# Patient Record
Sex: MALE | Race: BLACK OR AFRICAN AMERICAN | Employment: UNEMPLOYED | URBAN - METROPOLITAN AREA
[De-identification: names, ages, dates, MRNs, and addresses within clinical notes are randomized per-mention and may not be internally consistent; named-entity substitution may affect disease eponyms.]

---

## 2017-08-08 ENCOUNTER — HOSPITAL ENCOUNTER (OUTPATIENT)
Dept: GENERAL RADIOLOGY | Age: 32
Discharge: HOME | End: 2017-08-08
Attending: INTERNAL MEDICINE

## 2017-08-08 DIAGNOSIS — M25.50 JOINT PAIN: ICD-10-CM

## 2017-08-08 DIAGNOSIS — K63.3 ULCER OF CECUM: ICD-10-CM

## 2018-07-02 ENCOUNTER — HOSPITAL ENCOUNTER (OUTPATIENT)
Dept: GENERAL RADIOLOGY | Age: 33
Discharge: HOME | End: 2018-07-02
Attending: INTERNAL MEDICINE
Payer: COMMERCIAL

## 2018-07-02 DIAGNOSIS — K50.00 CROHN'S DISEASE OF SMALL INTESTINE (HCC): ICD-10-CM

## 2018-07-02 PROCEDURE — 74011000255 HC RX REV CODE- 255: Performed by: INTERNAL MEDICINE

## 2018-07-02 PROCEDURE — 74250 X-RAY XM SM INT 1CNTRST STD: CPT

## 2018-07-02 RX ADMIN — BARIUM SULFATE 352 G: 960 POWDER, FOR SUSPENSION ORAL at 09:00

## 2018-12-18 ENCOUNTER — HOSPITAL ENCOUNTER (EMERGENCY)
Age: 33
Discharge: HOME | End: 2018-12-18
Attending: EMERGENCY MEDICINE
Payer: COMMERCIAL

## 2018-12-18 ENCOUNTER — APPOINTMENT (OUTPATIENT)
Dept: GENERAL RADIOLOGY | Age: 33
End: 2018-12-18
Attending: EMERGENCY MEDICINE
Payer: COMMERCIAL

## 2018-12-18 ENCOUNTER — APPOINTMENT (OUTPATIENT)
Dept: CT IMAGING | Age: 33
End: 2018-12-18
Attending: EMERGENCY MEDICINE
Payer: COMMERCIAL

## 2018-12-18 VITALS
OXYGEN SATURATION: 99 % | WEIGHT: 165 LBS | SYSTOLIC BLOOD PRESSURE: 112 MMHG | DIASTOLIC BLOOD PRESSURE: 75 MMHG | HEIGHT: 70 IN | RESPIRATION RATE: 14 BRPM | BODY MASS INDEX: 23.62 KG/M2 | HEART RATE: 64 BPM | TEMPERATURE: 97.6 F

## 2018-12-18 DIAGNOSIS — K50.119 CROHN'S DISEASE OF LARGE INTESTINE WITH COMPLICATION (HCC): Primary | ICD-10-CM

## 2018-12-18 DIAGNOSIS — R11.2 NAUSEA AND VOMITING, INTRACTABILITY OF VOMITING NOT SPECIFIED, UNSPECIFIED VOMITING TYPE: ICD-10-CM

## 2018-12-18 DIAGNOSIS — K56.699 STRICTURE OF BOWEL (HCC): ICD-10-CM

## 2018-12-18 DIAGNOSIS — E87.6 HYPOKALEMIA: ICD-10-CM

## 2018-12-18 DIAGNOSIS — E83.42 HYPOMAGNESEMIA: ICD-10-CM

## 2018-12-18 LAB
ALBUMIN SERPL-MCNC: 2.9 G/DL (ref 3.4–5)
ALBUMIN/GLOB SERPL: 0.7 {RATIO} (ref 0.8–1.7)
ALP SERPL-CCNC: 115 U/L (ref 45–117)
ALT SERPL-CCNC: 28 U/L (ref 16–61)
ANION GAP SERPL CALC-SCNC: 8 MMOL/L (ref 3–18)
AST SERPL-CCNC: 17 U/L (ref 15–37)
BASOPHILS # BLD: 0 K/UL (ref 0–0.1)
BASOPHILS NFR BLD: 0 % (ref 0–2)
BILIRUB SERPL-MCNC: 0.2 MG/DL (ref 0.2–1)
BUN SERPL-MCNC: 10 MG/DL (ref 7–18)
BUN/CREAT SERPL: 13 (ref 12–20)
CALCIUM SERPL-MCNC: 8.1 MG/DL (ref 8.5–10.1)
CHLORIDE SERPL-SCNC: 108 MMOL/L (ref 100–108)
CO2 SERPL-SCNC: 28 MMOL/L (ref 21–32)
CREAT SERPL-MCNC: 0.76 MG/DL (ref 0.6–1.3)
DIFFERENTIAL METHOD BLD: ABNORMAL
EOSINOPHIL # BLD: 0 K/UL (ref 0–0.4)
EOSINOPHIL NFR BLD: 0 % (ref 0–5)
ERYTHROCYTE [DISTWIDTH] IN BLOOD BY AUTOMATED COUNT: 15.7 % (ref 11.6–14.5)
GLOBULIN SER CALC-MCNC: 4.1 G/DL (ref 2–4)
GLUCOSE SERPL-MCNC: 128 MG/DL (ref 74–99)
HCT VFR BLD AUTO: 39 % (ref 36–48)
HGB BLD-MCNC: 11.9 G/DL (ref 13–16)
LIPASE SERPL-CCNC: 89 U/L (ref 73–393)
LYMPHOCYTES # BLD: 0.5 K/UL (ref 0.9–3.6)
LYMPHOCYTES NFR BLD: 7 % (ref 21–52)
MAGNESIUM SERPL-MCNC: 1.3 MG/DL (ref 1.6–2.6)
MCH RBC QN AUTO: 24.7 PG (ref 24–34)
MCHC RBC AUTO-ENTMCNC: 30.5 G/DL (ref 31–37)
MCV RBC AUTO: 80.9 FL (ref 74–97)
MONOCYTES # BLD: 0.2 K/UL (ref 0.05–1.2)
MONOCYTES NFR BLD: 3 % (ref 3–10)
NEUTS SEG # BLD: 6.3 K/UL (ref 1.8–8)
NEUTS SEG NFR BLD: 90 % (ref 40–73)
PLATELET # BLD AUTO: 379 K/UL (ref 135–420)
PMV BLD AUTO: 9.2 FL (ref 9.2–11.8)
POTASSIUM SERPL-SCNC: 2.8 MMOL/L (ref 3.5–5.5)
PROT SERPL-MCNC: 7 G/DL (ref 6.4–8.2)
RBC # BLD AUTO: 4.82 M/UL (ref 4.7–5.5)
SODIUM SERPL-SCNC: 144 MMOL/L (ref 136–145)
WBC # BLD AUTO: 7 K/UL (ref 4.6–13.2)

## 2018-12-18 PROCEDURE — 83735 ASSAY OF MAGNESIUM: CPT

## 2018-12-18 PROCEDURE — 99285 EMERGENCY DEPT VISIT HI MDM: CPT

## 2018-12-18 PROCEDURE — 74011636320 HC RX REV CODE- 636/320: Performed by: EMERGENCY MEDICINE

## 2018-12-18 PROCEDURE — 96376 TX/PRO/DX INJ SAME DRUG ADON: CPT

## 2018-12-18 PROCEDURE — 96375 TX/PRO/DX INJ NEW DRUG ADDON: CPT

## 2018-12-18 PROCEDURE — 80053 COMPREHEN METABOLIC PANEL: CPT

## 2018-12-18 PROCEDURE — 83690 ASSAY OF LIPASE: CPT

## 2018-12-18 PROCEDURE — 74011250636 HC RX REV CODE- 250/636: Performed by: EMERGENCY MEDICINE

## 2018-12-18 PROCEDURE — 96366 THER/PROPH/DIAG IV INF ADDON: CPT

## 2018-12-18 PROCEDURE — 96365 THER/PROPH/DIAG IV INF INIT: CPT

## 2018-12-18 PROCEDURE — 96361 HYDRATE IV INFUSION ADD-ON: CPT

## 2018-12-18 PROCEDURE — 96368 THER/DIAG CONCURRENT INF: CPT

## 2018-12-18 PROCEDURE — 74177 CT ABD & PELVIS W/CONTRAST: CPT

## 2018-12-18 PROCEDURE — 85025 COMPLETE CBC W/AUTO DIFF WBC: CPT

## 2018-12-18 RX ORDER — POTASSIUM CHLORIDE 7.45 MG/ML
10 INJECTION INTRAVENOUS
Status: COMPLETED | OUTPATIENT
Start: 2018-12-18 | End: 2018-12-18

## 2018-12-18 RX ORDER — PREDNISONE 10 MG/1
TABLET ORAL
Qty: 70 TAB | Refills: 0 | Status: SHIPPED | OUTPATIENT
Start: 2018-12-18 | End: 2019-01-15

## 2018-12-18 RX ORDER — MAGNESIUM SULFATE HEPTAHYDRATE 40 MG/ML
2 INJECTION, SOLUTION INTRAVENOUS ONCE
Status: COMPLETED | OUTPATIENT
Start: 2018-12-18 | End: 2018-12-18

## 2018-12-18 RX ORDER — MORPHINE SULFATE 4 MG/ML
4 INJECTION INTRAVENOUS
Status: COMPLETED | OUTPATIENT
Start: 2018-12-18 | End: 2018-12-18

## 2018-12-18 RX ORDER — TRAMADOL HYDROCHLORIDE 50 MG/1
50 TABLET ORAL
Qty: 6 TAB | Refills: 0 | Status: SHIPPED | OUTPATIENT
Start: 2018-12-18

## 2018-12-18 RX ORDER — ONDANSETRON 8 MG/1
8 TABLET, ORALLY DISINTEGRATING ORAL
Qty: 12 TAB | Refills: 0 | Status: SHIPPED | OUTPATIENT
Start: 2018-12-18

## 2018-12-18 RX ORDER — ONDANSETRON 2 MG/ML
4 INJECTION INTRAMUSCULAR; INTRAVENOUS
Status: COMPLETED | OUTPATIENT
Start: 2018-12-18 | End: 2018-12-18

## 2018-12-18 RX ORDER — SODIUM CHLORIDE 9 MG/ML
125 INJECTION, SOLUTION INTRAVENOUS CONTINUOUS
Status: DISCONTINUED | OUTPATIENT
Start: 2018-12-18 | End: 2018-12-18 | Stop reason: HOSPADM

## 2018-12-18 RX ADMIN — DIATRIZOATE MEGLUMINE AND DIATRIZOATE SODIUM 30 ML: 660; 100 LIQUID ORAL; RECTAL at 11:14

## 2018-12-18 RX ADMIN — IOPAMIDOL 100 ML: 612 INJECTION, SOLUTION INTRAVENOUS at 13:12

## 2018-12-18 RX ADMIN — ONDANSETRON 4 MG: 2 INJECTION INTRAMUSCULAR; INTRAVENOUS at 11:16

## 2018-12-18 RX ADMIN — SODIUM CHLORIDE 1000 ML: 900 INJECTION, SOLUTION INTRAVENOUS at 09:00

## 2018-12-18 RX ADMIN — METHYLPREDNISOLONE SODIUM SUCCINATE 125 MG: 125 INJECTION, POWDER, FOR SOLUTION INTRAMUSCULAR; INTRAVENOUS at 14:34

## 2018-12-18 RX ADMIN — MORPHINE SULFATE 4 MG: 4 INJECTION INTRAVENOUS at 09:18

## 2018-12-18 RX ADMIN — SODIUM CHLORIDE 125 ML/HR: 900 INJECTION, SOLUTION INTRAVENOUS at 10:00

## 2018-12-18 RX ADMIN — POTASSIUM CHLORIDE 10 MEQ: 7.46 INJECTION, SOLUTION INTRAVENOUS at 11:03

## 2018-12-18 RX ADMIN — MAGNESIUM SULFATE HEPTAHYDRATE 2 G: 40 INJECTION, SOLUTION INTRAVENOUS at 10:00

## 2018-12-18 RX ADMIN — MORPHINE SULFATE 4 MG: 4 INJECTION INTRAVENOUS at 12:09

## 2018-12-18 RX ADMIN — POTASSIUM CHLORIDE 10 MEQ: 7.46 INJECTION, SOLUTION INTRAVENOUS at 10:00

## 2018-12-18 RX ADMIN — ONDANSETRON 4 MG: 2 INJECTION INTRAMUSCULAR; INTRAVENOUS at 09:18

## 2018-12-18 NOTE — DISCHARGE INSTRUCTIONS
Diet for Inflammatory Bowel Disease: Care Instructions  Your Care Instructions    Crohn's disease and ulcerative colitis are types of inflammatory bowel disease. What you eat doesn't increase the inflammation that causes your disease. But some types of foods, such as high-fiber fruits and vegetables, may make your symptoms worse. No one diet is right for everyone with an inflammatory bowel disease. Foods that bother one person may not bother another. Your diet has to be tailored for you. Follow-up care is a key part of your treatment and safety. Be sure to make and go to all appointments, and call your doctor if you are having problems. It's also a good idea to know your test results and keep a list of the medicines you take. How can you care for yourself at home? · Keep a food diary. As soon as you know what foods make your symptoms worse, your doctor or dietitian can help you plan the right diet for you. · During a flare-up, avoid or reduce foods that make symptoms worse. ? Choose dairy products that are low in lactose, such as yogurt, lactose-reduced milk, and hard cheeses like cheddar. ? If you have fat in your stools, choose low-fat foods instead of high-fat ones. For instance, some cuts of red meat have a lot of fat. A low-fat choice would be lean beef (such as sirloin, top and bottom round, usha, or diet lean hamburger), poultry, or fish, such as cod.  ? Instead of frying foods, try baking or broiling them. ? Cook fruits and vegetables without hulls, skins, or seeds. ? Try different ways of preparing fruits and vegetables, such as steaming, stewing, or baking. ? Peel and seed fresh fruits and vegetables if these bother you, or choose canned varieties. · Get the calories and nutrients you need. ? Eat a varied, nutritious diet that is high in calories and protein. ? Try eating 3 meals plus 2 or 3 snacks a day.  It may be easier to get more calories if you spread your food intake throughout the day. ? Take vitamin and mineral supplements if your doctor recommends them. ? Try adding high-calorie liquid supplements, such as Ensure Plus or Boost Plus, if you have trouble keeping your weight up.  ? See your doctor or dietitian if your diet feels too limited or you are losing weight. · Make sure to get enough iron. Rectal bleeding may make you lose iron. Good sources of iron include:  ? Beef. ? Lentils. ? Spinach. ? Raisins. ? Iron-enriched breads and cereals. When should you call for help? Watch closely for changes in your health, and be sure to contact your doctor if you have any problems. Where can you learn more? Go to http://zainab-james.info/. Enter J027 in the search box to learn more about \"Diet for Inflammatory Bowel Disease: Care Instructions. \"  Current as of: March 29, 2018  Content Version: 11.8  © 5171-3497 [email or web address]. Care instructions adapted under license by [employer] (which disclaims liability or warranty for this information). If you have questions about a medical condition or this instruction, always ask your healthcare professional. [place] [address] any warranty or liability for your use of this information. Hypokalemia: Care Instructions  Your Care Instructions    Hypokalemia (say \"wc-rh-iyo-JESSICA-jaiden-uh\") is a low level of potassium. The heart, muscles, kidneys, and nervous system all need potassium to work well. This problem has many different causes. Kidney problems, diet, and medicines like diuretics and laxatives can cause it. So can vomiting or diarrhea. In some cases, cancer is the cause. Your doctor may do tests to find the cause of your low potassium levels. You may need medicines to bring your potassium levels back to normal. You may also need regular blood tests to check your potassium. If you have very low potassium, you may need intravenous (IV) medicines.  You also may need tests to check the electrical activity of your heart. Heart problems caused by low potassium levels can be very serious. Follow-up care is a key part of your treatment and safety. Be sure to make and go to all appointments, and call your doctor if you are having problems. It's also a good idea to know your test results and keep a list of the medicines you take. How can you care for yourself at home? · If your doctor recommends it, eat foods that have a lot of potassium. These include fresh fruits, juices, and vegetables. They also include nuts, beans, and milk. · Be safe with medicines. If your doctor prescribes medicines or potassium supplements, take them exactly as directed. Call your doctor if you have any problems with your medicines. · Get your potassium levels tested as often as your doctor tells you. When should you call for help? Call 911 anytime you think you may need emergency care. For example, call if:    · You feel like your heart is missing beats. Heart problems caused by low potassium can cause death.     · You passed out (lost consciousness).     · You have a seizure.    Call your doctor now or seek immediate medical care if:    · You feel weak or unusually tired.     · You have severe arm or leg cramps.     · You have tingling or numbness.     · You feel sick to your stomach, or you vomit.     · You have belly cramps.     · You feel bloated or constipated.     · You have to urinate a lot.     · You feel very thirsty most of the time.     · You are dizzy or lightheaded, or you feel like you may faint.     · You feel depressed, or you lose touch with reality.    Watch closely for changes in your health, and be sure to contact your doctor if:    · You do not get better as expected. Where can you learn more? Go to http://zainab-james.info/. Enter G358 in the search box to learn more about \"Hypokalemia: Care Instructions. \"  Current as of: March 15, 2018  Content Version: 11.8  © 1114-5751 [employer]. Care instructions adapted under license by [employer] (which disclaims liability or warranty for this information). If you have questions about a medical condition or this instruction, always ask your healthcare professional. [address] any warranty or liability for your use of this information. Crohn's Disease: Care Instructions  Your Care Instructions    Crohn's disease is a lifelong inflammatory bowel disease (IBD). Parts of the digestive tract get swollen and irritated and may develop deep sores called ulcers. Crohn's disease usually occurs in the last part of the small intestine and the first part of the large intestine. But it can develop anywhere from the mouth to the anus. The main symptoms of Crohn's disease are belly pain, diarrhea, fever, and weight loss. Some people may have constipation. Crohn's disease also sometimes causes problems with the joints, eyes, or skin. Your symptoms may be mild at some times and severe at others. The disease can also go into remission, which means that it is not active and you have no symptoms. Bad attacks of Crohn's disease often have to be treated in the hospital so that you can get medicines and liquids through a tube in your vein, called an IV. This gives your digestive system time to rest and recover. Talk with your doctor about the best treatments for you. You may need medicines that help prevent or treat flare-ups of the disease. You may need surgery to remove part of your bowel if you have an abnormal opening in the bowel (fistula), an abscess, or a bowel obstruction. In some cases, surgery is needed if medicines do not work. But symptoms often return to other areas of the intestines after surgery. Learning good self-care can help you reduce your symptoms and manage Crohn's disease. Follow-up care is a key part of your treatment and safety.  Be sure to make and go to all appointments, and call your doctor if you are having problems. It's also a good idea to know your test results and keep a list of the medicines you take. How can you care for yourself at home? · Take your medicines exactly as prescribed. Call your doctor if you think you are having a problem with your medicine. You will get more details on the specific medicines your doctor prescribes. · Do not take anti-inflammatory medicines, such as aspirin, ibuprofen (Advil, Motrin), or naproxen (Aleve). They may make your symptoms worse. Do not take any other medicines or herbal products without talking to your doctor first.  · Avoid foods that make your symptoms worse. These might include milk, alcohol, high-fiber foods, or spicy foods. · Eat a healthy diet. Make sure to get enough iron. Rectal bleeding may make you lose iron. Good sources of iron include beef, lentils, spinach, raisins, and iron-enriched breads and cereals. · Drink liquid meal replacements if your doctor recommends them. These are high in calories and contain vitamins and minerals. Severe symptoms may make it hard for your body to absorb vitamins and minerals. · Do not smoke. Smoking makes Crohn's disease worse. If you need help quitting, talk to your doctor about stop-smoking programs and medicines. These can increase your chances of quitting for good. · Seek support from friends and family to help cope with Crohn's disease. The illness can affect all parts of your life. Get counseling if you need it. When should you call for help? Call 911 anytime you think you may need emergency care.  For example, call if:    · Your stools are maroon or very bloody.     · You passed out (lost consciousness).    Call your doctor now or seek immediate medical care if:    · You are vomiting.     · You have new or worse belly pain.     · You have a fever.     · You cannot pass stools or gas.     · You have new or more blood in your stools.    Watch closely for changes in your health, and be sure to contact your doctor if:    · You have new or worse symptoms.     · You are losing weight.     · You do not get better as expected. Where can you learn more? Go to http://zainab-james.info/. Enter [id number] in the search box to learn more about \"Crohn's Disease: Care Instructions. \"  Current as of: March 28, 2018  Content Version: 11.8  © 8344-3898 [employer]. Care instructions adapted under license by [employer] (which disclaims liability or warranty for this information). If you have questions about a medical condition or this instruction, always ask your healthcare professional. [place] [address] any warranty or liability for your use of this information. Bowel Blockage (Intestinal Obstruction): Care Instructions  Your Care Instructions  A bowel blockage, also called an intestinal obstruction, can prevent gas, fluids, or solids from moving through the intestines normally. It can cause constipation and, rarely, diarrhea. You may have pain, nausea, vomiting, and cramping. Most of the time, complete blockages require a stay in the hospital and possibly surgery. But if your bowel is only partly blocked, your doctor may tell you to wait until it clears on its own and you are able to pass gas and stool. If so, there are things you can do at home to help make you feel better. If you have had surgery for a bowel blockage, there are things you can do at home to make sure you heal well. You can also make some changes to keep your bowel from becoming blocked again. Follow-up care is a key part of your treatment and safety. Be sure to make and go to all appointments, and call your doctor if you are having problems. It's also a good idea to know your test results and keep a list of the medicines you take. How can you care for yourself at home?   If your doctor has told you to wait at home for a blockage to clear on its own:  · Follow your doctor's instructions. These may include eating a liquid diet to avoid complete blockage. · Be safe with medicines. Take your medicines exactly as prescribed. Call your doctor if you think you are having a problem with your medicine. · Put a heating pad set on low on your belly to relieve mild cramps and pain. To prevent another blockage  · Try to eat smaller amounts of food more often. For example, have 5 or 6 small meals throughout the day instead of 2 or 3 large meals. · Chew your food very well. Try to chew each bite about 20 times or until it is liquid. · Avoid high-fiber foods and raw fruits and vegetables with skins, husks, strings, or seeds. These can form a ball of undigested material that can cause a blockage if a part of your bowel is scarred or narrowed. · Check with your doctor before you eat whole-grain products or use a fiber supplement such as Citrucel or Metamucil. · To help you have regular bowel movements, eat at regular times, do not strain during a bowel movement, and drink at least 8 to 10 glasses of water each day. If you have kidney, heart, or liver disease and have to limit fluids, talk with your doctor or before you increase the amount of fluids you drink. · Drink high-calorie liquid formulas if your doctor says to. Severe symptoms may make it hard for your body to take in vitamins and minerals. · Get regular exercise. It helps you digest your food better. Get at least 30 minutes of physical activity on most days of the week. Walking is a good choice. When should you call for help? Call your doctor now or seek immediate medical care if:    · You have a fever.     · You are vomiting.     · You have new or worse belly pain.     · You cannot pass stools or gas.    Watch closely for changes in your health, and be sure to contact your doctor if you have any problems. Where can you learn more? Go to http://zainab-james.info/.   Enter K128 in the search box to learn more about \"Bowel Blockage (Intestinal Obstruction): Care Instructions. \"  Current as of: March 28, 2018  Content Version: 11.8  © 8148-7519 [employer]. Care instructions adapted under license by [employer] (which disclaims liability or warranty for this information). If you have questions about a medical condition or this instruction, always ask your healthcare professional. [place] [address] any warranty or liability for your use of this information.

## 2018-12-18 NOTE — ED PROVIDER NOTES
EMERGENCY DEPARTMENT HISTORY AND PHYSICAL EXAM    9:48 AM      Date: 12/18/2018  Patient Name: Slava Henderson    History of Presenting Illness     Chief Complaint   Patient presents with    Abdominal Pain    Vomiting         History Provided By: Patient, girlfriend    Chief Complaint: vomiting  Duration: 1 Days  Timing:  Acute and Progressive  Location: stomach  Quality: Aching and Sharp  Severity: Moderate  Modifying Factors: none  Associated Symptoms: abd pain, chills, diarrhea      Additional History (Context): Slava Henderson is a 28 y.o. male with Chron's disease who presents with episodes of emesis onset at 3 am day of arrival. Pt confirms associated sx of chills, diarrhea and abd pain that is diffuse. He is not on medication for Chron's but states has issue often and when he goes to the ED he is given prednisone with relief until sx begin again. He denies any history of abd surgeries and states diet has been normal. His gastroenterologist is Dr. Pearl Batista who he has not seen since last September due to financial restraints. He denies any NKDA, tobacco use but does drink etOH. No other concerns or symptoms at this time. PCP: Unknown, Provider    Current Facility-Administered Medications   Medication Dose Route Frequency Provider Last Rate Last Dose    0.9% sodium chloride infusion  125 mL/hr IntraVENous CONTINUOUS Isai Graham  mL/hr at 12/18/18 1000 125 mL/hr at 12/18/18 1000     Current Outpatient Medications   Medication Sig Dispense Refill    predniSONE (DELTASONE) 10 mg tablet Take 40 mg by mouth daily for 7 days, THEN 30 mg daily for 7 days, THEN 20 mg daily for 7 days, THEN 10 mg daily for 7 days. With Breakfast 70 Tab 0    ondansetron (ZOFRAN ODT) 8 mg disintegrating tablet Take 1 Tab by mouth every eight (8) hours as needed for Nausea for up to 12 doses. 12 Tab 0    traMADol (ULTRAM) 50 mg tablet Take 1 Tab by mouth every six (6) hours as needed for Pain.  Max Daily Amount: 200 mg. 6 Tab 0       Past History     Past Medical History:  Past Medical History:   Diagnosis Date    Crohn disease ([address]        Past Surgical History:  No past surgical history on file. Family History:  No family history on file. Social History:  Social History     Tobacco Use    Smoking status: Not on file   Substance Use Topics    Alcohol use: Not on file    Drug use: Not on file       Allergies:  No Known Allergies      Review of Systems       Review of Systems   Constitutional: Positive for chills. Negative for activity change, fatigue and fever. HENT: Negative for congestion and rhinorrhea. Eyes: Negative for visual disturbance. Respiratory: Negative for shortness of breath. Cardiovascular: Negative for chest pain and palpitations. Gastrointestinal: Positive for abdominal pain and diarrhea. Negative for nausea and vomiting. Genitourinary: Negative for dysuria and hematuria. Musculoskeletal: Negative for back pain. Skin: Negative for rash. Neurological: Negative for dizziness, weakness and light-headedness. All other systems reviewed and are negative. Physical Exam     Visit Vitals  /75   Pulse 64   Temp 97.6 °F (36.4 °C)   Resp 14   Ht 5' 10\" (1.778 m)   Wt 74.8 kg (165 lb)   SpO2 99%   BMI 23.68 kg/m²         Physical Exam   Constitutional: He is oriented to person, place, and time. He appears well-developed and well-nourished. No distress. Diffuse pain, mild distress    HENT:   Head: Normocephalic and atraumatic. Right Ear: External ear normal.   Left Ear: External ear normal.   Nose: Nose normal.   Mouth/Throat: Oropharynx is clear and moist.   Eyes: Conjunctivae and EOM are normal. Pupils are equal, round, and reactive to light. No scleral icterus. Neck: Normal range of motion. Neck supple. No JVD present. No tracheal deviation present. No thyromegaly present. Cardiovascular: Normal rate, regular rhythm, normal heart sounds and intact distal pulses.  Exam reveals no gallop and no friction rub. No murmur heard. Pulmonary/Chest: Effort normal and breath sounds normal. He exhibits no tenderness. Abdominal: Soft. Bowel sounds are normal. He exhibits no distension. There is tenderness. There is guarding. There is no rebound. Voluntary guarding RLQ   Musculoskeletal: Normal range of motion. He exhibits no edema or tenderness. Lymphadenopathy:     He has no cervical adenopathy. Neurological: He is alert and oriented to person, place, and time. No cranial nerve deficit. Coordination normal.   No sensory loss, Gait normal, Motor 5/5   Skin: Skin is warm and dry. Psychiatric:   Supportive family/spouse at the bedside    Nursing note and vitals reviewed. Diagnostic Study Results     Labs -  Recent Results (from the past 12 hour(s))   CBC WITH AUTOMATED DIFF    Collection Time: 12/18/18  8:45 AM   Result Value Ref Range    WBC 7.0 4.6 - 13.2 K/uL    RBC 4.82 4.70 - 5.50 M/uL    HGB 11.9 (L) 13.0 - 16.0 g/dL    HCT 39.0 36.0 - 48.0 %    MCV 80.9 74.0 - 97.0 FL    MCH 24.7 24.0 - 34.0 PG    MCHC 30.5 (L) 31.0 - 37.0 g/dL    RDW 15.7 (H) 11.6 - 14.5 %    PLATELET 063 034 - 816 K/uL    MPV 9.2 9.2 - 11.8 FL    NEUTROPHILS 90 (H) 40 - 73 %    LYMPHOCYTES 7 (L) 21 - 52 %    MONOCYTES 3 3 - 10 %    EOSINOPHILS 0 0 - 5 %    BASOPHILS 0 0 - 2 %    ABS. NEUTROPHILS 6.3 1.8 - 8.0 K/UL    ABS. LYMPHOCYTES 0.5 (L) 0.9 - 3.6 K/UL    ABS. MONOCYTES 0.2 0.05 - 1.2 K/UL    ABS. EOSINOPHILS 0.0 0.0 - 0.4 K/UL    ABS.  BASOPHILS 0.0 0.0 - 0.1 K/UL    DF AUTOMATED     METABOLIC PANEL, COMPREHENSIVE    Collection Time: 12/18/18  8:45 AM   Result Value Ref Range    Sodium 144 136 - 145 mmol/L    Potassium 2.8 (LL) 3.5 - 5.5 mmol/L    Chloride 108 100 - 108 mmol/L    CO2 28 21 - 32 mmol/L    Anion gap 8 3.0 - 18 mmol/L    Glucose 128 (H) 74 - 99 mg/dL    BUN 10 7.0 - 18 MG/DL    Creatinine 0.76 0.6 - 1.3 MG/DL    BUN/Creatinine ratio 13 12 - 20      GFR est AA >60 >60 ml/min/1.73m2 GFR est non-AA >60 >60 ml/min/1.73m2    Calcium 8.1 (L) 8.5 - 10.1 MG/DL    Bilirubin, total 0.2 0.2 - 1.0 MG/DL    ALT (SGPT) 28 16 - 61 U/L    AST (SGOT) 17 15 - 37 U/L    Alk. phosphatase 115 45 - 117 U/L    Protein, total 7.0 6.4 - 8.2 g/dL    Albumin 2.9 (L) 3.4 - 5.0 g/dL    Globulin 4.1 (H) 2.0 - 4.0 g/dL    A-G Ratio 0.7 (L) 0.8 - 1.7     LIPASE    Collection Time: 12/18/18  8:45 AM   Result Value Ref Range    Lipase 89 73 - 393 U/L   MAGNESIUM    Collection Time: 12/18/18  8:45 AM   Result Value Ref Range    Magnesium 1.3 (L) 1.6 - 2.6 mg/dL       Radiologic Studies -   CT ABD PELV W CONT   Final Result   Impression:         1. There is a short segment of stenosis, enhancement and bowel wall thickening   at the level of the terminal ileum consistent with Crohn's disease. This results   in mild partial small bowel obstruction. I see no abscess. No fistulous tract   identified with certainty. 2. Very small amount of free fluid in the pelvis likely related to number 1.   3. Bilateral sacroiliitis likely related to inflammatory bowel disease. 4. Small benign-appearing mesenteric cyst on the left side. 5. Cardiomegaly. Medical Decision Making   I am the first provider for this patient. I reviewed the vital signs, available nursing notes, past medical history, past surgical history, family history and social history. Vital Signs-Reviewed the patient's vital signs. Pulse Oximetry Analysis -  100% on room air (Interpretation WNL)    Cardiac Monitor:  Rate: 53 BPM    Records Reviewed: Nursing Notes and Old Medical Records (Time of Review: 9:48 AM)    ED Course: Progress Notes, Reevaluation, and Consults:    Consult:  Discussed care with GI lance on call, Specialty: gastroenterology. Standard discussion; including history of patients chief complaint, available diagnostic results, and treatment course. Reviewed notes from office.  Pt has missed last 2 appointments and not started Stelara treatment. She recommended a CT w/ contrast to evaluate for abscess or perforation. 10:59 AM, 12/18/2018     Consult:  Discussed care with Dr. Shama Paris, Specialty: GI  Standard discussion; including history of patients chief complaint, available diagnostic results, and treatment course. States if pt cannot tolerate PO then will need to be admitted. If he can then he can be discharged and seen in office within the next two weeks to discuss next steps of care. He should be placed on a long prednisone taper for one month without ABX. Will discuss options with pt and family. 2:15 PM, 12/18/2018     2:43 PM: Spoke with patient and family. Pt would prefer to not be admitted but started vomiting during evaluation. Will give him a few minutes and retry PO challenge. 4:57 PM: Spoke with patient and family. Pt is tolerating PO and ambulating, would like to go home as it is his daughter's birthday. Will give 28 day prednisone regimine as well as nausea and pain medications and pt instructed to FU with GI in next two weeks. Will also give name of other GI specialist to see if pt interested in FU with someone else. Pt educated on signs of worsening and return precautions, he verbalized understanding. Marcie Lopez DO     Provider Notes (Medical Decision Making): MDM  Number of Diagnoses or Management Options  Diagnosis management comments: Pt is a 31yo male with a hx Crohn's disease, terminal ileitis, no regular meds presents with increasing diffuse pain with nausea and vomiting. Pt was following with GI an had small bowel series 7/18 noting lesions and fistulous tracts near the cecum. Pt has not been taking any meds and has been resistant to regular care due to cost.  In the ED he has diffuse pain with some guarding. Wanted to get a plain film and prep the patient for a CT with contrast but he is refusing. He notes he had one in May.   I discussed with him my concern for progressive disease especially without treatment but he continues to refuse. I had this conversation with his spouse at the bedside. Will hydrate, trend labs, replete lytes, pain control, and discuss with GI regarding a plan to see if we can convince him to have some imaging. Alyson Pool, DO 10:03 AM          Diagnosis     Clinical Impression:   1. Crohn's disease of large intestine with complication ([address]    2. Stricture of bowel ([address]    3. Nausea and vomiting, intractability of vomiting not specified, unspecified vomiting type    4. Hypokalemia    5. Hypomagnesemia        Disposition: Home    Follow-up Information     Follow up With Specialties Details Why [address] EMERGENCY DEPT Emergency Medicine  As needed, If symptoms worsen [address] [facility]    Bren Richard MD Gastroenterology Schedule an appointment as soon as possible for a visit in 1 [address] for recheck of ongoing symptoms [address] [facility] [id number]  [phone]                Medication List      START taking these medications    ondansetron 8 mg disintegrating tablet  Commonly known as:  ZOFRAN ODT  Take 1 Tab by mouth every eight (8) hours as needed for Nausea for up to 12 doses. traMADol 50 mg tablet  Commonly known as:  ULTRAM  Take 1 Tab by mouth every six (6) hours as needed for Pain. Max Daily Amount: 200 mg. CHANGE how you take these medications    predniSONE 10 mg tablet  Commonly known as:  DELTASONE  Take 40 mg by mouth daily for 7 days, THEN 30 mg daily for 7 days, THEN 20 mg daily for 7 days, THEN 10 mg daily for 7 days. With Breakfast  Start taking on:  12/18/2018  What changed:    · medication strength  · See the new instructions.            Where to Get Your Medications      Information about where to get these medications is not yet available    Ask your nurse or doctor about these medications  · ondansetron 8 mg disintegrating tablet  · predniSONE 10 mg tablet  · traMADol 50 mg tablet       _______________________________       Scribe Attestation     Wei Juarez acting as a scribe for and in the presence of Elton Grant MD      December 18, 2018 at 9:48 AM       Provider Attestation:      I personally performed the services described in the documentation, reviewed the documentation, as recorded by the scribe in my presence, and it accurately and completely records my words and actions.  December 18, 2018 at 9:48 AM - Elton Grant MD        _______________________________

## 2018-12-18 NOTE — ED NOTES
Patient armband removed and shreddedI have reviewed discharge instructions with the patient. The patient verbalized understanding. rx x 3 and Gastro referral print out from ER doctor. Pt waiting for ride home.

## 2018-12-18 NOTE — ED NOTES
Patient completed oral contrast.  CT notified.   They will do scan 1.5 hours after start of contrast.

## 2018-12-18 NOTE — ED NOTES
Patient ambulatory to restroom. No complaints of pain. IV potassium infusing. Family members at bedside. Call bell within reach.

## 2018-12-18 NOTE — ED NOTES
Patient states he feels no pain currently. Has agreed now to have abdominal xray. Connected to cardiac monitor with electrolytes infusing.

## 2018-12-18 NOTE — ED NOTES
Patient comfortably with family at bedside. No complaints of pain or vomiting. Awaiting CT results. VS stable.

## 2018-12-18 NOTE — ED NOTES
Assumed care of pt at this time, report received from Leslie Dial, [address] [facility]

## 2018-12-18 NOTE — ED NOTES
Pt reports he was able to keep down gingerale, has slight nausea and burping but no emesis, pain is returning at 4/10 was \"just trying to sleep it off\"